# Patient Record
Sex: FEMALE | Race: BLACK OR AFRICAN AMERICAN | NOT HISPANIC OR LATINO | Employment: OTHER | URBAN - METROPOLITAN AREA
[De-identification: names, ages, dates, MRNs, and addresses within clinical notes are randomized per-mention and may not be internally consistent; named-entity substitution may affect disease eponyms.]

---

## 2024-08-22 ENCOUNTER — HOSPITAL ENCOUNTER (EMERGENCY)
Facility: CLINIC | Age: 44
Discharge: HOME | End: 2024-08-22
Attending: EMERGENCY MEDICINE | Admitting: EMERGENCY MEDICINE
Payer: MEDICARE

## 2024-08-22 ENCOUNTER — APPOINTMENT (OUTPATIENT)
Dept: ULTRASOUND IMAGING | Facility: CLINIC | Age: 44
End: 2024-08-22
Payer: MEDICARE

## 2024-08-22 VITALS
HEART RATE: 75 BPM | RESPIRATION RATE: 20 BRPM | SYSTOLIC BLOOD PRESSURE: 129 MMHG | DIASTOLIC BLOOD PRESSURE: 65 MMHG | HEIGHT: 66 IN | WEIGHT: 200 LBS | TEMPERATURE: 97.7 F | OXYGEN SATURATION: 100 % | BODY MASS INDEX: 32.14 KG/M2

## 2024-08-22 DIAGNOSIS — D25.9 UTERINE FIBROID: ICD-10-CM

## 2024-08-22 DIAGNOSIS — N93.9 VAGINAL BLEEDING: ICD-10-CM

## 2024-08-22 DIAGNOSIS — Z86.2 HISTORY OF ANEMIA: ICD-10-CM

## 2024-08-22 DIAGNOSIS — Z87.448 HISTORY OF CHRONIC KIDNEY DISEASE: ICD-10-CM

## 2024-08-22 DIAGNOSIS — R79.1 SUBTHERAPEUTIC INTERNATIONAL NORMALIZED RATIO (INR): ICD-10-CM

## 2024-08-22 DIAGNOSIS — Z95.2 HISTORY OF MITRAL VALVE REPLACEMENT WITH MECHANICAL VALVE: ICD-10-CM

## 2024-08-22 DIAGNOSIS — N88.8 NABOTHIAN CYST: ICD-10-CM

## 2024-08-22 LAB
ABO/RH(D): NORMAL
ALBUMIN SERPL BCG-MCNC: 3.8 G/DL (ref 3.5–5.2)
ALP SERPL-CCNC: 132 U/L (ref 40–150)
ALT SERPL W P-5'-P-CCNC: 15 U/L (ref 0–50)
ANION GAP SERPL CALCULATED.3IONS-SCNC: 11 MMOL/L (ref 7–15)
ANTIBODY SCREEN: NEGATIVE
AST SERPL W P-5'-P-CCNC: 18 U/L (ref 0–45)
ATRIAL RATE - MUSE: 79 BPM
BASOPHILS # BLD AUTO: 0 10E3/UL (ref 0–0.2)
BASOPHILS NFR BLD AUTO: 1 %
BILIRUB SERPL-MCNC: 0.2 MG/DL
BUN SERPL-MCNC: 22.9 MG/DL (ref 6–20)
CALCIUM SERPL-MCNC: 8.9 MG/DL (ref 8.8–10.4)
CHLORIDE SERPL-SCNC: 100 MMOL/L (ref 98–107)
CREAT SERPL-MCNC: 2.11 MG/DL (ref 0.51–0.95)
DIASTOLIC BLOOD PRESSURE - MUSE: NORMAL MMHG
EGFRCR SERPLBLD CKD-EPI 2021: 29 ML/MIN/1.73M2
EOSINOPHIL # BLD AUTO: 0.2 10E3/UL (ref 0–0.7)
EOSINOPHIL NFR BLD AUTO: 3 %
ERYTHROCYTE [DISTWIDTH] IN BLOOD BY AUTOMATED COUNT: 15 % (ref 10–15)
GLUCOSE SERPL-MCNC: 186 MG/DL (ref 70–99)
HCG SERPL QL: NEGATIVE
HCO3 SERPL-SCNC: 24 MMOL/L (ref 22–29)
HCT VFR BLD AUTO: 29.3 % (ref 35–47)
HGB BLD-MCNC: 9.1 G/DL (ref 11.7–15.7)
IMM GRANULOCYTES # BLD: 0.1 10E3/UL
IMM GRANULOCYTES NFR BLD: 1 %
INR PPP: 1.77 (ref 0.85–1.15)
INTERPRETATION ECG - MUSE: NORMAL
LYMPHOCYTES # BLD AUTO: 0.9 10E3/UL (ref 0.8–5.3)
LYMPHOCYTES NFR BLD AUTO: 15 %
MCH RBC QN AUTO: 25.3 PG (ref 26.5–33)
MCHC RBC AUTO-ENTMCNC: 31.1 G/DL (ref 31.5–36.5)
MCV RBC AUTO: 82 FL (ref 78–100)
MONOCYTES # BLD AUTO: 0.4 10E3/UL (ref 0–1.3)
MONOCYTES NFR BLD AUTO: 7 %
NEUTROPHILS # BLD AUTO: 4.4 10E3/UL (ref 1.6–8.3)
NEUTROPHILS NFR BLD AUTO: 74 %
NRBC # BLD AUTO: 0 10E3/UL
NRBC BLD AUTO-RTO: 0 /100
P AXIS - MUSE: 38 DEGREES
PLATELET # BLD AUTO: 325 10E3/UL (ref 150–450)
POTASSIUM SERPL-SCNC: 3.7 MMOL/L (ref 3.4–5.3)
PR INTERVAL - MUSE: 206 MS
PROT SERPL-MCNC: 7.8 G/DL (ref 6.4–8.3)
QRS DURATION - MUSE: 102 MS
QT - MUSE: 396 MS
QTC - MUSE: 454 MS
R AXIS - MUSE: 9 DEGREES
RBC # BLD AUTO: 3.59 10E6/UL (ref 3.8–5.2)
SODIUM SERPL-SCNC: 135 MMOL/L (ref 135–145)
SPECIMEN EXPIRATION DATE: NORMAL
SYSTOLIC BLOOD PRESSURE - MUSE: NORMAL MMHG
T AXIS - MUSE: -73 DEGREES
TSH SERPL DL<=0.005 MIU/L-ACNC: 0.74 UIU/ML (ref 0.3–4.2)
VENTRICULAR RATE- MUSE: 79 BPM
WBC # BLD AUTO: 6 10E3/UL (ref 4–11)

## 2024-08-22 PROCEDURE — 84703 CHORIONIC GONADOTROPIN ASSAY: CPT

## 2024-08-22 PROCEDURE — 96372 THER/PROPH/DIAG INJ SC/IM: CPT

## 2024-08-22 PROCEDURE — 76830 TRANSVAGINAL US NON-OB: CPT

## 2024-08-22 PROCEDURE — 85025 COMPLETE CBC W/AUTO DIFF WBC: CPT

## 2024-08-22 PROCEDURE — 85610 PROTHROMBIN TIME: CPT

## 2024-08-22 PROCEDURE — 80053 COMPREHEN METABOLIC PANEL: CPT

## 2024-08-22 PROCEDURE — 86900 BLOOD TYPING SEROLOGIC ABO: CPT

## 2024-08-22 PROCEDURE — 84443 ASSAY THYROID STIM HORMONE: CPT

## 2024-08-22 PROCEDURE — 250N000011 HC RX IP 250 OP 636

## 2024-08-22 PROCEDURE — 93005 ELECTROCARDIOGRAM TRACING: CPT | Mod: RTG

## 2024-08-22 PROCEDURE — 99285 EMERGENCY DEPT VISIT HI MDM: CPT | Mod: 25

## 2024-08-22 PROCEDURE — 36415 COLL VENOUS BLD VENIPUNCTURE: CPT

## 2024-08-22 RX ORDER — DILTIAZEM HYDROCHLORIDE 300 MG/1
300 CAPSULE, COATED, EXTENDED RELEASE ORAL DAILY
COMMUNITY

## 2024-08-22 RX ORDER — METOPROLOL SUCCINATE 200 MG/1
200 TABLET, EXTENDED RELEASE ORAL 2 TIMES DAILY
COMMUNITY

## 2024-08-22 RX ORDER — DIGOXIN 125 MCG
125 TABLET ORAL DAILY
COMMUNITY

## 2024-08-22 RX ORDER — QUETIAPINE 400 MG/1
400 TABLET, FILM COATED, EXTENDED RELEASE ORAL AT BEDTIME
COMMUNITY

## 2024-08-22 RX ORDER — MEDROXYPROGESTERONE ACETATE 10 MG
20 TABLET ORAL 3 TIMES DAILY
Qty: 42 TABLET | Refills: 0 | Status: SHIPPED | OUTPATIENT
Start: 2024-08-22 | End: 2024-08-29

## 2024-08-22 RX ORDER — ENOXAPARIN SODIUM 100 MG/ML
90 INJECTION SUBCUTANEOUS ONCE
Status: COMPLETED | OUTPATIENT
Start: 2024-08-22 | End: 2024-08-22

## 2024-08-22 RX ORDER — FERROUS GLUCONATE 324(38)MG
324 TABLET ORAL
Qty: 15 TABLET | Refills: 0 | Status: SHIPPED | OUTPATIENT
Start: 2024-08-22 | End: 2024-09-06

## 2024-08-22 RX ORDER — DOCUSATE SODIUM 100 MG/1
100 CAPSULE, LIQUID FILLED ORAL 2 TIMES DAILY PRN
COMMUNITY

## 2024-08-22 RX ORDER — WARFARIN SODIUM 5 MG/1
5-10 TABLET ORAL DAILY
COMMUNITY

## 2024-08-22 RX ORDER — FLUTICASONE PROPIONATE AND SALMETEROL 250; 50 UG/1; UG/1
1 POWDER RESPIRATORY (INHALATION) 2 TIMES DAILY
COMMUNITY

## 2024-08-22 RX ORDER — PANTOPRAZOLE SODIUM 40 MG/1
40 TABLET, DELAYED RELEASE ORAL AT BEDTIME
COMMUNITY

## 2024-08-22 RX ORDER — LAMOTRIGINE 300 MG/1
300 TABLET, EXTENDED RELEASE ORAL EVERY MORNING
COMMUNITY

## 2024-08-22 RX ORDER — MONTELUKAST SODIUM 10 MG/1
10 TABLET ORAL AT BEDTIME
COMMUNITY

## 2024-08-22 RX ORDER — CHLORTHALIDONE 25 MG/1
25 TABLET ORAL EVERY MORNING
COMMUNITY

## 2024-08-22 RX ORDER — ATORVASTATIN CALCIUM 40 MG/1
40 TABLET, FILM COATED ORAL DAILY
COMMUNITY

## 2024-08-22 RX ORDER — HYDRALAZINE HYDROCHLORIDE 100 MG/1
100 TABLET, FILM COATED ORAL 4 TIMES DAILY
COMMUNITY

## 2024-08-22 RX ADMIN — ENOXAPARIN SODIUM 90 MG: 100 INJECTION SUBCUTANEOUS at 15:46

## 2024-08-22 ASSESSMENT — ACTIVITIES OF DAILY LIVING (ADL)
ADLS_ACUITY_SCORE: 35

## 2024-08-22 ASSESSMENT — COLUMBIA-SUICIDE SEVERITY RATING SCALE - C-SSRS
1. IN THE PAST MONTH, HAVE YOU WISHED YOU WERE DEAD OR WISHED YOU COULD GO TO SLEEP AND NOT WAKE UP?: NO
6. HAVE YOU EVER DONE ANYTHING, STARTED TO DO ANYTHING, OR PREPARED TO DO ANYTHING TO END YOUR LIFE?: NO
2. HAVE YOU ACTUALLY HAD ANY THOUGHTS OF KILLING YOURSELF IN THE PAST MONTH?: NO

## 2024-08-22 NOTE — ED PROVIDER NOTES
Emergency Department Note      History of Present Illness     Chief Complaint   Vaginal Bleeding and Dizziness      HPI   Awa Ocampo is a 43 year old female with history of DM2, mitral valve mechanical replacement taking warfarin, congestive heart failure, hypertension and stage 3 CKD who presents with complaint of vaginal bleeding.  Patient states she believes this is her menstrual period per her normal schedule.  Vaginal bleeding started 5 days ago, on Saturday, has been going through 1 pad every 2-3 hours since.  Bleeding significantly increased this morning around 7 AM.  Since this morning, she has been passing clots the size of the palm of her hand and has been going through 2 pads an hour.  She also endorses lightheadedness.  She denies fever or chills, headache, chest pain, shortness of breath, nausea and vomiting, dysuria and hematuria.  She reports her target INR is 2.5-3.0 for her mechanical mitral valve.    Independent Historian   None    Review of External Notes   4/24/2024 laboratory results reviewed, hemoglobin 10.5  6/26/2024 creatinine clearance 32 mL/min  5/3/2024 creatinine clearance 33.6 mL/min  Past Medical History     Medical History and Problem List   No past medical history on file.    Medications   atorvastatin (LIPITOR) 40 MG tablet  budesonide (RINOCORT AQUA) 32 MCG/ACT nasal spray  chlorthalidone (HYGROTON) 25 MG tablet  digoxin (LANOXIN) 125 MCG tablet  diltiazem ER COATED BEADS (CARDIZEM CD/CARTIA XT) 300 MG 24 hr capsule  docusate sodium (COLACE) 100 MG capsule  ferrous gluconate (FERGON) 324 (38 Fe) MG tablet  FLUoxetine (PROZAC) 20 MG capsule  fluticasone-salmeterol (ADVAIR) 250-50 MCG/ACT inhaler  hydrALAZINE (APRESOLINE) 100 MG tablet  LamoTRIgine (LAMICTAL XR) 300 MG 24 hr tablet  medroxyPROGESTERone (PROVERA) 10 MG tablet  metoprolol succinate ER (TOPROL XL) 200 MG 24 hr tablet  montelukast (SINGULAIR) 10 MG tablet  pantoprazole (PROTONIX) 40 MG EC tablet  QUEtiapine ER  (SEROQUEL XR) 400 MG 24 hr tablet  sacubitril-valsartan (ENTRESTO) 49-51 MG per tablet  sitagliptin (JANUVIA) 50 MG tablet  warfarin ANTICOAGULANT (COUMADIN) 5 MG tablet        Surgical History   No past surgical history on file.    Physical Exam     No data found.    Physical Exam  Physical Exam:  GENERAL: Warm, dry, alert, no increased work of breathing, laying back on gurney without acute distress, not toxic appearing  HEENT: PERRLA, no scleral icterus, clear conjunctiva, oropharynx clear  NECK: No JVD, supple without lymphadenopathy.  No stiffness or restricted range of motion  HEART: Systolic murmur, regular rate and rhythm  LUNGS: CTAB, moving air well.  No crackles or wheezes are heard.  ABD: Soft, nontender, nondistended, no guarding, with good bowel sounds heard.  BACK: No CVAT, no obvious deformities  EXTREMITIES: Moves all extremities without difficulty, no calf tenderness or peripheral edema.  SKIN: Warm and dry without rash or lesions.  NEUROLOGICAL: No focal deficits.  PSYCH: Appropriate mood and affect.     Diagnostics     Lab Results   Labs Ordered and Resulted from Time of ED Arrival to Time of ED Departure   INR - Abnormal       Result Value    INR 1.77 (*)    COMPREHENSIVE METABOLIC PANEL - Abnormal    Sodium 135      Potassium 3.7      Carbon Dioxide (CO2) 24      Anion Gap 11      Urea Nitrogen 22.9 (*)     Creatinine 2.11 (*)     GFR Estimate 29 (*)     Calcium 8.9      Chloride 100      Glucose 186 (*)     Alkaline Phosphatase 132      AST 18      ALT 15      Protein Total 7.8      Albumin 3.8      Bilirubin Total 0.2     CBC WITH PLATELETS AND DIFFERENTIAL - Abnormal    WBC Count 6.0      RBC Count 3.59 (*)     Hemoglobin 9.1 (*)     Hematocrit 29.3 (*)     MCV 82      MCH 25.3 (*)     MCHC 31.1 (*)     RDW 15.0      Platelet Count 325      % Neutrophils 74      % Lymphocytes 15      % Monocytes 7      % Eosinophils 3      % Basophils 1      % Immature Granulocytes 1      NRBCs per 100 WBC 0       Absolute Neutrophils 4.4      Absolute Lymphocytes 0.9      Absolute Monocytes 0.4      Absolute Eosinophils 0.2      Absolute Basophils 0.0      Absolute Immature Granulocytes 0.1      Absolute NRBCs 0.0     HCG QUALITATIVE PREGNANCY - Normal    hCG Serum Qualitative Negative     TSH WITH FREE T4 REFLEX - Normal    TSH 0.74     TYPE AND SCREEN, ADULT    ABO/RH(D) B POS      Antibody Screen Negative      SPECIMEN EXPIRATION DATE [id number]         Imaging   US Pelvic Complete w Transvaginal   Final Result   IMPRESSION:    1. No torsion demonstrated.   2. Probable complex nabothian cyst.   3. Subcentimeter fibroid.      JANICE JEAN-BAPTISTE MD            SYSTEM ID:  [id number]          EKG   ECG results from 08/22/24   EKG 12-lead, tracing only     Value    Systolic Blood Pressure     Diastolic Blood Pressure     Ventricular Rate 79    Atrial Rate 79    DE Interval 206    QRS Duration 102        QTc 454    P Axis 38    R AXIS 9    T Axis -73    Interpretation ECG      Sinus rhythm  Minimal voltage criteria for LVH, may be normal variant ( R in aVL )  ST & T wave abnormality, consider inferior ischemia  Abnormal ECG  No previous ECGs available         Independent Interpretation   None    ED Course      Medications Administered   Medications   enoxaparin ANTICOAGULANT (LOVENOX) injection 90 mg (90 mg Subcutaneous $Given 8/22/24 1545)       Procedures   Procedures     Discussion of Management   OB/GYN, Dr. Childs  Staffed with Dr. Ann  Clinical Pharm Timmy  ED Course   ED Course as of 08/26/24 0654   Thu Aug 22, 2024   1220 I evaluated and examined the patient   1433 I reevaluated the patient, she reports having blood through 1 pad during her stay in the emergency department.  No other change.   1446 I spoke with OB/Gyn, Dr. Childs, who recommends Provera 20 mg PO tid for 7 days.   1457 I spoke with clinical pharmacist, Timmy, regarding subtherapeutic INR, he recommends Lovenox 90 mg twice daily until Saturday.   He also recommends 10 mg of warfarin today, 10 mg of warfarin tomorrow, 10 mg on Saturday then resume normal dose on Sunday.       Additional Documentation  None    Medical Decision Making / Diagnosis     CMS Diagnoses: None    MIPS       None    MDM   Awa Ocampo is a 43 year old female with history of DM2, mitral valve mechanical replacement taking warfarin, congestive heart failure, hypertension and stage 3 CKD who presents with complaint of vaginal bleeding.  Differential includes but is not limited to coagulopathy, ovulatory dysfunction, endometrial hyperplasia, endometrial polyp, adenomyosis, leiomyoma, and malignancy among many others.  Vital signs reassuring at presentation without tachycardia, hypotension or fever.  On physical exam, patient was fairly well-appearing with moist mucous membranes and nontoxic.    Patient is visiting [place] from [place], previous chart was initially unavailable, previous laboratory findings were also unavailable.  Patient has not been checking her INR since arrival in [place] 1 week ago.  On laboratory workup, TSH was was within normal limit, hCG was negative, there were no electrolyte derangements, and no leukocytosis..  Patient's INR was 1.77, she is subtherapeutic, goal INR is 2.5-3.0.  Patient was also anemic with hemoglobin 9.1, her baseline obtained April 2024 was 10.5.  Patient's kidney function also slightly decreased from baseline, GFR today 29, creatinine clearance was 32 mL/min in June 2024.  I spoke with OB/GYN, Dr. Childs, she recommends a prescription for Provera, 20 mg p.o. 3 times daily x 7 days.  I spoke with our clinical pharmacist Timmy regarding subtherapeutic INR, he recommends Lovenox 90 mg twice daily until Saturday.  I spoke with the patient regarding findings and recommendations by both OB/GYN and the clinical pharmacist.  She states she agrees with this plan.  I also offered her a prescription for iron for anemia, I recommended she take this  with vitamin C.  Patient will be given 1 dose of Lovenox while in the ED, she has enough syringes of Lovenox to last until she returns home to [place] on Saturday in 2 days.  Patient states that she understands and agrees with all the plans as described above.  Return precautions were also provided.  The patient was subsequently discharged in stable condition in the company of her .      Disposition   The patient was discharged.     Diagnosis     ICD-10-CM    1. Subtherapeutic international normalized ratio (INR)  R79.1       2. History of mitral valve replacement with mechanical valve  Z95.2       3. History of chronic kidney disease  Z87.448       4. History of anemia  Z86.2       5. Vaginal bleeding  N93.9       6. Nabothian cyst  N88.8       7. Uterine fibroid  D25.9            Discharge Medications   Discharge Medication List as of 8/22/2024  3:47 PM        START taking these medications    Details   ferrous gluconate (FERGON) 324 (38 Fe) MG tablet Take 1 tablet (324 mg) by mouth daily (with breakfast) for 15 days., Disp-15 tablet, R-0, Local PrintTake with OJ or other source of Vitamin C      medroxyPROGESTERone (PROVERA) 10 MG tablet Take 2 tablets (20 mg) by mouth 3 times daily for 7 days., Disp-42 tablet, R-0, Local Print               ESTEBAN Love John, PA-C  08/26/24 5443

## 2024-08-22 NOTE — DISCHARGE INSTRUCTIONS
Thank you for coming to [facility] emergency department.  Your ultrasound was reassuring today, they found a small cyst and a small fibroid.  Our OB/GYN recommends a short prescription for Provera which should help control bleeding.  I will also provide you a prescription for iron.  Your INR was subtherapeutic.  I spoke with our clinical pharmacist, he recommends using 90 mg of Lovenox twice per day starting tomorrow.  He also recommends changing your warfarin dose to 10 mg of warfarin today, 10 mg of warfarin tomorrow, 10 mg on Saturday then resume normal dose on Sunday.  It is important you follow-up with your primary care doctor when you return to [place].  Please return to the emergency department should you experience worsening vaginal bleeding, shortness of breath, or any other concern.

## 2024-08-22 NOTE — ED TRIAGE NOTES
Patient takes coumadin and has been having a heavy period with blood clots the size of golf balls for the last 24 hours. Patient went through 2 pads last night in less than an hour. Today patient feels light headed and dizzy.     Patient taking coumadin for MVR

## 2024-08-22 NOTE — ED PROVIDER NOTES
"ED APC SUPERVISION NOTE:   I evaluated this patient in conjunction with Janice Allen PA-C  I have participated in the care of the patient and personally performed key elements of the history, exam, and medical decision making.      HPI:   Awa Ocampo is a 43 year old female on Warfarin with a history of MVR, DM2, CHF, hypertension who presents to the ED with vaginal bleeding and dizziness. Patient reports she is on her period currently and normally has heavy periods but this has been abnormally heavy. Patient reports she has been going through 1 pad an hour for the last week but this morning this increased to 2 pads per hour this morning. She also reports having clots the size of her hand. Endorses dizziness and lightheadedness with standing. Lives out of state from [place].     Independent Historian:   None    Review of External Notes: An oncology note from June of this year was reviewed demonstrating hemoglobin of 10.1     EXAM:   /65   Pulse 75   Temp 97.7  F (36.5  C) (Temporal)   Resp 20   Ht 1.676 m (5' 6\")   Wt 90.7 kg (200 lb)   SpO2 100%   BMI 32.28 kg/m     General: Alert, interactive   Head:  Scalp is atraumatic  Eyes:  The pupils are equal, round, and reactive to light    EOM's intact    No scleral icterus  ENT:      Nose:  The external nose is normal  Ears:  External ears are normal  Mouth/Throat: Mucus membranes are moist      Neck:  Normal range of motion.      There is no rigidity.    Trachea is in the midline         CV:  Regular rate and rhythm  Midsystolic click present  Resp:  Breath sounds are clear bilaterally    Non-labored, no retractions or accessory muscle use    US Pelvic Complete w Transvaginal   Final Result   IMPRESSION:    1. No torsion demonstrated.   2. Probable complex nabothian cyst.   3. Subcentimeter fibroid.      JANICE JEAN-BAPTISTE MD            SYSTEM ID:  [id number]           Labs Ordered and Resulted from Time of ED Arrival to Time of ED Departure   INR - " Abnormal       Result Value    INR 1.77 (*)    COMPREHENSIVE METABOLIC PANEL - Abnormal    Sodium 135      Potassium 3.7      Carbon Dioxide (CO2) 24      Anion Gap 11      Urea Nitrogen 22.9 (*)     Creatinine 2.11 (*)     GFR Estimate 29 (*)     Calcium 8.9      Chloride 100      Glucose 186 (*)     Alkaline Phosphatase 132      AST 18      ALT 15      Protein Total 7.8      Albumin 3.8      Bilirubin Total 0.2     CBC WITH PLATELETS AND DIFFERENTIAL - Abnormal    WBC Count 6.0      RBC Count 3.59 (*)     Hemoglobin 9.1 (*)     Hematocrit 29.3 (*)     MCV 82      MCH 25.3 (*)     MCHC 31.1 (*)     RDW 15.0      Platelet Count 325      % Neutrophils 74      % Lymphocytes 15      % Monocytes 7      % Eosinophils 3      % Basophils 1      % Immature Granulocytes 1      NRBCs per 100 WBC 0      Absolute Neutrophils 4.4      Absolute Lymphocytes 0.9      Absolute Monocytes 0.4      Absolute Eosinophils 0.2      Absolute Basophils 0.0      Absolute Immature Granulocytes 0.1      Absolute NRBCs 0.0     HCG QUALITATIVE PREGNANCY - Normal    hCG Serum Qualitative Negative     TSH WITH FREE T4 REFLEX - Normal    TSH 0.74     ROUTINE UA WITH MICROSCOPIC REFLEX TO CULTURE   TYPE AND SCREEN, ADULT   ABO/RH TYPE AND SCREEN        Medications - No data to display     Independent Interpretation (X-rays, CTs, rhythm strip):  None    Consultations/Discussion of Management or Tests:  We discussed case with our clinical pharmacist  MARIAM Allen will discuss case with the on-call OB/GYN regarding therapeutics    Social Determinants of Health affecting care:   None     MEDICAL DECISION MAKING/ASSESSMENT AND PLAN:   Follow presentation history and physical examination are performed, the above workup was undertaken.  Ultrasound is reassuring, I think this likely represents dysfunctional uterine bleeding likely complicated by the patient's use of anticoagulants.  Patient is hemodynamically stable with a hemoglobin near her baseline of 10.   She does not require transfusion at this time.  Her case is complicated by use of anticoagulants and she is actually subtherapeutic with regards to her mechanical valve.  She would likely need bridging anticoagulants and close monitoring of her vaginal bleeding.  Patient will be placed on Provera, we will bridge with Lovenox and alter her warfarin dosing, she will closely follow-up with her primary care provider when she returns to [place].  She was advised to return the emergency department immediately if new symptoms including worsening bleeding develop.     DIAGNOSIS:     ICD-10-CM    1. Subtherapeutic international normalized ratio (INR)  R79.1       2. History of mitral valve replacement with mechanical valve  Z95.2       3. History of chronic kidney disease  Z87.448       4. History of anemia  Z86.2       5. Vaginal bleeding  N93.9             DISPOSITION:   Patient be discharged home feels comfortable this plan of action     Scribe Disclosure:  Lico DAI, am serving as a scribe at 12:16 PM on 8/22/2024 to document services personally performed by Primo Ann MD based on my observations and the provider's statements to me.     8/22/2024  [facility] EMERGENCY DEPT     Primo Ann MD  08/22/24 [id number]